# Patient Record
Sex: MALE | Race: BLACK OR AFRICAN AMERICAN | Employment: FULL TIME | URBAN - METROPOLITAN AREA
[De-identification: names, ages, dates, MRNs, and addresses within clinical notes are randomized per-mention and may not be internally consistent; named-entity substitution may affect disease eponyms.]

---

## 2018-02-06 PROBLEM — R59.1 LYMPHADENOPATHY: Status: ACTIVE | Noted: 2017-06-05

## 2018-02-06 PROBLEM — Z92.89 HISTORY OF BLOOD PRODUCT TRANSFUSION: Status: ACTIVE | Noted: 2017-06-05

## 2018-02-06 PROBLEM — Z98.890 HISTORY OF HEMORRHOIDECTOMY: Status: ACTIVE | Noted: 2017-06-05

## 2018-02-06 PROBLEM — Z82.49 FAMILY HISTORY OF MI (MYOCARDIAL INFARCTION): Status: ACTIVE | Noted: 2017-06-05

## 2018-02-06 PROBLEM — F41.9 ANXIETY DISORDER: Status: ACTIVE | Noted: 2018-02-06

## 2018-02-06 PROBLEM — Q28.2 AVM (ARTERIOVENOUS MALFORMATION) BRAIN: Status: ACTIVE | Noted: 2018-02-06

## 2018-02-06 PROBLEM — G47.33 OSA (OBSTRUCTIVE SLEEP APNEA): Status: ACTIVE | Noted: 2018-02-06

## 2018-07-12 ENCOUNTER — HOSPITAL ENCOUNTER (EMERGENCY)
Age: 43
Discharge: LEFT BEFORE DISCHARGE | End: 2018-07-12
Attending: EMERGENCY MEDICINE
Payer: COMMERCIAL

## 2018-07-12 VITALS
OXYGEN SATURATION: 100 % | HEIGHT: 68 IN | BODY MASS INDEX: 23.95 KG/M2 | DIASTOLIC BLOOD PRESSURE: 80 MMHG | HEART RATE: 81 BPM | WEIGHT: 158 LBS | RESPIRATION RATE: 18 BRPM | SYSTOLIC BLOOD PRESSURE: 136 MMHG | TEMPERATURE: 97.4 F

## 2018-07-12 DIAGNOSIS — Z53.21 PATIENT LEFT WITHOUT BEING SEEN: Primary | ICD-10-CM

## 2018-07-12 PROCEDURE — 75810000275 HC EMERGENCY DEPT VISIT NO LEVEL OF CARE

## 2018-07-12 RX ORDER — MECLIZINE HYDROCHLORIDE 25 MG/1
25 TABLET ORAL
COMMUNITY

## 2018-07-12 NOTE — ED NOTES
Pt ambulatory to ER with c/o neck pain and HA after fall out of bed this morning. Pt reports being asleep and dreaming \"and I remember sitting up in my dream and the next thing I know I was on the floor\". Pt reports that he didn't hit anything other than the floor. Pt c/o right sided neck pain and right sided HA. Pt denies any bony tenderness. Pt denies numbness or tingling. Pt denies N/V. Pt denies dizziness or blurry vision. Pt alert and oriented.  RR even and unlabored

## 2018-07-12 NOTE — ED TRIAGE NOTES
Pt arrives ambulatory to ED with c\o neck and back pain after falling out of bed, pt is able to make needs known speaking in complete sentences, pt in nad at this time

## 2019-03-29 PROBLEM — R39.9 LOWER URINARY TRACT SYMPTOMS (LUTS): Status: ACTIVE | Noted: 2019-03-29

## 2019-03-29 PROBLEM — N52.9 ED (ERECTILE DYSFUNCTION) OF ORGANIC ORIGIN: Status: ACTIVE | Noted: 2019-03-29

## 2019-03-29 PROBLEM — N43.3 RIGHT HYDROCELE: Status: ACTIVE | Noted: 2019-03-29

## 2019-03-29 PROBLEM — N50.811 RIGHT TESTICULAR PAIN: Status: ACTIVE | Noted: 2019-03-29

## 2019-09-16 ENCOUNTER — APPOINTMENT (OUTPATIENT)
Dept: GENERAL RADIOLOGY | Age: 44
End: 2019-09-16
Attending: EMERGENCY MEDICINE
Payer: COMMERCIAL

## 2019-09-16 ENCOUNTER — HOSPITAL ENCOUNTER (EMERGENCY)
Age: 44
Discharge: HOME | End: 2019-09-16
Attending: EMERGENCY MEDICINE
Payer: COMMERCIAL

## 2019-09-16 VITALS
HEART RATE: 90 BPM | HEIGHT: 68 IN | RESPIRATION RATE: 18 BRPM | WEIGHT: 175 LBS | TEMPERATURE: 98.5 F | SYSTOLIC BLOOD PRESSURE: 138 MMHG | OXYGEN SATURATION: 100 % | DIASTOLIC BLOOD PRESSURE: 80 MMHG | BODY MASS INDEX: 26.52 KG/M2

## 2019-09-16 DIAGNOSIS — J20.9 ACUTE BRONCHITIS, UNSPECIFIED ORGANISM: Primary | ICD-10-CM

## 2019-09-16 DIAGNOSIS — Z21 HIV POSITIVE (HCC): ICD-10-CM

## 2019-09-16 PROCEDURE — 71046 X-RAY EXAM CHEST 2 VIEWS: CPT

## 2019-09-16 PROCEDURE — 93005 ELECTROCARDIOGRAM TRACING: CPT

## 2019-09-16 PROCEDURE — 99284 EMERGENCY DEPT VISIT MOD MDM: CPT

## 2019-09-16 PROCEDURE — 74011250637 HC RX REV CODE- 250/637: Performed by: EMERGENCY MEDICINE

## 2019-09-16 RX ORDER — DOXYCYCLINE 100 MG/1
100 CAPSULE ORAL
Status: COMPLETED | OUTPATIENT
Start: 2019-09-16 | End: 2019-09-16

## 2019-09-16 RX ORDER — DOXYCYCLINE 100 MG/1
100 CAPSULE ORAL 2 TIMES DAILY
Qty: 14 CAP | Refills: 0 | Status: SHIPPED | OUTPATIENT
Start: 2019-09-16 | End: 2019-09-23

## 2019-09-16 RX ORDER — BENZONATATE 100 MG/1
100 CAPSULE ORAL
Qty: 30 CAP | Refills: 0 | Status: SHIPPED | OUTPATIENT
Start: 2019-09-16 | End: 2019-09-23

## 2019-09-16 RX ADMIN — DOXYCYCLINE 100 MG: 100 CAPSULE ORAL at 23:43

## 2019-09-17 LAB
ATRIAL RATE: 77 BPM
CALCULATED P AXIS, ECG09: 44 DEGREES
CALCULATED R AXIS, ECG10: -22 DEGREES
CALCULATED T AXIS, ECG11: 26 DEGREES
DIAGNOSIS, 93000: NORMAL
P-R INTERVAL, ECG05: 172 MS
Q-T INTERVAL, ECG07: 320 MS
QRS DURATION, ECG06: 86 MS
QTC CALCULATION (BEZET), ECG08: 362 MS
VENTRICULAR RATE, ECG03: 77 BPM

## 2019-09-17 NOTE — ED PROVIDER NOTES
EMERGENCY DEPARTMENT HISTORY AND PHYSICAL EXAM    Date: 9/16/2019  Patient Name: Rondel Brittle. History of Presenting Illness     Chief Complaint   Patient presents with    Cough    Chest Pain         History Provided By: Patient    History:   9:50 PM  Rondel Brittle. is a 38 y.o. male with PMHx of HIV, hepatitis B, and obstructive sleep apnea on CPAP who presents to the emergency department with complaint of a constant nonproductive cough, onset 2 months ago. Associated sxs include chest pain for 2 weeks, chest congestion, postnasal drip. Patient reports sick contact with coworker who also has a cough. Patient takes Clarinex and Flonase for allergies. Patient has a history of heart catheterization in 2013 with cardiac stent placement. Last CD4 count was in the 600's; his viral load is undetected. Has FHx of MI and CHF in father. Patient denies nausea, vomiting, diarrhea, or any other sxs or complaints. Patient does not endorse tobacco, EtOH, or illicit drug use    PCP: Sandee Swenson MD   Infectious Disease Specialist: Honorio King MD    Current Facility-Administered Medications   Medication Dose Route Frequency Provider Last Rate Last Dose    doxycycline (VIBRAMYCIN) capsule 100 mg  100 mg Oral NOW Roseanne Olivas MD         Current Outpatient Medications   Medication Sig Dispense Refill    doxycycline (MONODOX) 100 mg capsule Take 1 Cap by mouth two (2) times a day for 7 days. 14 Cap 0    benzonatate (TESSALON PERLES) 100 mg capsule Take 1 Cap by mouth three (3) times daily as needed for Cough for up to 7 days. 30 Cap 0    pseudoephedrine-codeine-gg (CHERATUSSIN DAC) 30- mg/5 mL solution Take 10 mL by mouth four (4) times daily as needed for Cough for up to 7 days. Max Daily Amount: 40 mL. 90 mL 0    ALPRAZolam (XANAX) 1 mg tablet Take 1 mg by mouth.  vxufahpjt-wdeicrpz-gcnqnyw ala (BIKTARVY) tab tablet Take 1 Tab by mouth.       meclizine (ANTIVERT) 25 mg tablet Take 25 mg by mouth three (3) times daily as needed.  valACYclovir (VALTREX) 1 gram tablet Take  by mouth.  ketoconazole (NIZORAL) 2 % shampoo Apply  to affected area daily as needed for Itching.  clindamycin (CLINDAGEL) 1 % topical gel Apply  to affected area two (2) times a day. use thin film on affected area      atorvastatin (LIPITOR) 10 mg tablet Take 10 mg by mouth daily.  desloratadine (CLARINEX) 5 mg tablet Take 5 mg by mouth daily.  gabapentin (NEURONTIN) 300 mg capsule Take 300 mg by mouth as needed.  methocarbamol (ROBAXIN) 500 mg tablet Take 2 Tabs by mouth three (3) times daily. 24 Tab 0    SERTRALINE HCL (SERTRALINE PO) Take 30 mg by mouth daily.  Aspirin, Buffered 81 mg tab Take 81 mg by mouth daily.  metoprolol succinate (TOPROL-XL) 25 mg XL tablet Take 25 mg by mouth daily. Pt instructed to take with a small bit of water on DOS      nitroglycerin (NITROSTAT) 0.4 mg SL tablet 0.4 mg by SubLINGual route every five (5) minutes as needed for Chest Pain.          Past History     Past Medical History:  Past Medical History:   Diagnosis Date    Acute hepatitis B     Acute MI ([facility] [address] 11/29/2013    s/p stenting, EF normal    Anemia     Chest pain     Chronic headache     Cough     Drowsiness     Dyslipidemia 11/13/2014    Generalized anxiety disorder with panic attacks     on Zoloft adn Xanax prn follwoed by Dr Marisol Lindo    GERD (gastroesophageal reflux disease)     Herpes simplex     HIV (human immunodeficiency virus infection) ([facility] [address]     Hypertension     H/O in the past    IBS (irritable bowel syndrome)     Liver disease     Hepatitis B, currently testing for Hep C    Nocturia     Occipital neuralgia     SHIRA (obstructive sleep apnea)     mild RDI 5 ovidio SpO2 86%  min on 11/7/2017 on CPAP 7cwp    Snoring     Tired     Transfusion history        Past Surgical History:  Past Surgical History:   Procedure Laterality Date    HX APPENDECTOMY      HX CHOLECYSTECTOMY      HX COLONOSCOPY  2006    RECORD Dr. Tony Painting  2013    stents x 2    HX HEMORRHOIDECTOMY  2015    Post-hemorrhoidectomy hemorrhage 2015 hospitalized at [facility]. Pt's h/h went as low . At discharge h/h was  at that time adn required transfuion 4 units of blood       Family History:  Family History   Problem Relation Age of Onset    Heart Failure Father 58    Cancer Father     Diabetes Father     Heart Attack Father     Hypertension Father    Erle Butch Arthritis-rheumatoid Mother     Asthma Mother     Diabetes Mother     Hypertension Mother        Social History:  Social History     Tobacco Use    Smoking status: Former Smoker     Types: Cigars     Last attempt to quit: 6/10/2014     Years since quittin.2    Smokeless tobacco: Never Used   Substance Use Topics    Alcohol use: No    Drug use: No       Allergies: Allergies   Allergen Reactions    Other Food Other (comments)     Shiitake mushrooms- lots of itching and redness     Pollen Extracts Other (comments)         Review of Systems   Review of Systems   Constitutional: Negative for chills, diaphoresis, fever and unexpected weight change. HENT: Negative for congestion, drooling, ear pain, rhinorrhea, sore throat, tinnitus and trouble swallowing. Eyes: Negative for photophobia, pain, redness and visual disturbance. Respiratory: Positive for cough. Negative for choking, chest tightness, shortness of breath, wheezing and stridor. Cardiovascular: Positive for chest pain. Negative for palpitations and leg swelling. Gastrointestinal: Negative for abdominal distention, abdominal pain, anal bleeding, blood in stool, constipation, diarrhea, nausea and vomiting. Genitourinary: Negative for difficulty urinating, dysuria, flank pain, frequency, hematuria and urgency. Musculoskeletal: Negative for arthralgias, back pain and neck pain.    Skin: Negative for color change, rash and wound. Allergic/Immunologic: Positive for environmental allergies and immunocompromised state. Neurological: Negative for dizziness, seizures, syncope, speech difficulty, light-headedness and headaches. Hematological: Does not bruise/bleed easily. Psychiatric/Behavioral: Negative for agitation, behavioral problems, hallucinations, self-injury and suicidal ideas. The patient is not hyperactive. Physical Exam     Vitals:    09/16/19 2056   BP: (!) 144/93   Pulse: 91   Resp: 17   Temp: 98.8 °F (37.1 °C)   SpO2: 100%   Weight: 79.4 kg (175 lb)   Height: 5' 8\" (1.727 m)     Physical Exam   Constitutional: He is oriented to person, place, and time. He appears well-developed and well-nourished. No distress. Pleasant gentleman, well appearing, non-toxic   HENT:   Head: Normocephalic and atraumatic. Right Ear: External ear normal.   Left Ear: External ear normal.   Mouth/Throat: No oropharyngeal exudate. Erythema and hypertrophy to the sinuses. Throat is red. Eyes: Pupils are equal, round, and reactive to light. Conjunctivae and EOM are normal. No scleral icterus. No pallor   Neck: Normal range of motion. Neck supple. No JVD present. No tracheal deviation present. No thyromegaly present. Cardiovascular: Normal rate, regular rhythm and normal heart sounds. Pulmonary/Chest: Effort normal and breath sounds normal. No stridor. No respiratory distress. Abdominal: Soft. Bowel sounds are normal. He exhibits no distension. There is no tenderness. There is no rebound and no guarding. Musculoskeletal: Normal range of motion. He exhibits no edema or tenderness. No soft tissue injuries   Lymphadenopathy:     He has cervical adenopathy. No lymph nodes in posterior triangle. Few scattered anterior cervical adenopathy. Neurological: He is alert and oriented to person, place, and time. He has normal reflexes. No cranial nerve deficit. Coordination normal.   Skin: Skin is warm and dry. No rash noted. He is not diaphoretic. No erythema. Psychiatric: He has a normal mood and affect. His behavior is normal. Judgment and thought content normal.   Nursing note and vitals reviewed. Diagnostic Study Results     Labs -     Recent Results (from the past 12 hour(s))   EKG, 12 LEAD, INITIAL    Collection Time: 09/16/19  9:22 PM   Result Value Ref Range    Ventricular Rate 77 BPM    Atrial Rate 77 BPM    P-R Interval 172 ms    QRS Duration 86 ms    Q-T Interval 320 ms    QTC Calculation (Bezet) 362 ms    Calculated P Axis 44 degrees    Calculated R Axis -22 degrees    Calculated T Axis 26 degrees    Diagnosis       Normal sinus rhythm  Minimal voltage criteria for LVH, may be normal variant  Inferior infarct (cited on or before 02-JUL-2015)  Abnormal ECG  When compared with ECG of 02-MAR-2016 18:50,  No significant change was found         Radiologic Studies -    XR CHEST PA LAT    (Results Pending)     10:01 PM  RADIOLOGY FINDINGS  Chest X-ray shows NAP  Pending review by Radiologist  Recorded by Gia Rogers ED Scribe, as dictated by Mauri. Iqra Grant MD    Medical Decision Making   I am the first provider for this patient. I reviewed the vital signs, available nursing notes, past medical history, past surgical history, family history and social history. Vital Signs-Reviewed the patient's vital signs. Pulse Oximetry Analysis - 100% on room air     Cardiac Monitor:  Rate: 91 bpm  Rhythm: bpm    EKG interpretation: (Preliminary)  9:22 PM  NSR at 77 bpm. Borderline LVH. Q waves in all inferior leads. EKG read by Mauri. Iqra Grant MD     Records Reviewed: Nursing Notes and Old Medical Records    Provider Notes (Medical Decision Making):   Ddx: While he may have hep A and hep B, he has had normal CD4 count and his viral load is undetected. Patient has a normal WBC, 5.2. He has no left shift. General chemistry was normal except blood sugar of 108. He has symptoms of bronchitis.  His x-ray was normal. Will cover with antibiotics. Treat symptoms and discharge. Procedures:  Procedures    MEDICATIONS GIVEN:  Medications   doxycycline (VIBRAMYCIN) capsule 100 mg (has no administration in time range)       ED Course:   9:50 PM   Initial assessment performed. The patients presenting problems have been discussed, and they are in agreement with the care plan formulated and outlined with them. I have encouraged them to ask questions as they arise throughout their visit. Diagnosis and Disposition     DISCHARGE NOTE:  11:28 PM  Seb Curry Jr.'s  results have been reviewed with him. He has been counseled regarding his diagnosis, treatment, and plan. He verbally conveys understanding and agreement of the signs, symptoms, diagnosis, treatment and prognosis and additionally agrees to follow up as discussed. He also agrees with the care-plan and conveys that all of his questions have been answered. I have also provided discharge instructions for him that include: educational information regarding their diagnosis and treatment, and list of reasons why they would want to return to the ED prior to their follow-up appointment, should his condition change. He has been provided with education for proper emergency department utilization. CLINICAL IMPRESSION:    1. Acute bronchitis, unspecified organism    2. [facility]        PLAN:  1. D/C Home  2. Current Discharge Medication List      START taking these medications    Details   doxycycline (MONODOX) 100 mg capsule Take 1 Cap by mouth two (2) times a day for 7 days. Qty: 14 Cap, Refills: 0      benzonatate (TESSALON PERLES) 100 mg capsule Take 1 Cap by mouth three (3) times daily as needed for Cough for up to 7 days. Qty: 30 Cap, Refills: 0      pseudoephedrine-codeine-gg (CHERATUSSIN DAC) 30- mg/5 mL solution Take 10 mL by mouth four (4) times daily as needed for Cough for up to 7 days. Max Daily Amount: 40 mL.   Qty: 90 mL, Refills: 0 Associated Diagnoses: Acute bronchitis, unspecified organism         CONTINUE these medications which have NOT CHANGED    Details   ALPRAZolam (XANAX) 1 mg tablet Take 1 mg by mouth. qwbteokbk-urolwgpi-iduhmlt ala (BIKTARVY) tab tablet Take 1 Tab by mouth.      meclizine (ANTIVERT) 25 mg tablet Take 25 mg by mouth three (3) times daily as needed. valACYclovir (VALTREX) 1 gram tablet Take  by mouth.      ketoconazole (NIZORAL) 2 % shampoo Apply  to affected area daily as needed for Itching. clindamycin (CLINDAGEL) 1 % topical gel Apply  to affected area two (2) times a day. use thin film on affected area      atorvastatin (LIPITOR) 10 mg tablet Take 10 mg by mouth daily. desloratadine (CLARINEX) 5 mg tablet Take 5 mg by mouth daily. gabapentin (NEURONTIN) 300 mg capsule Take 300 mg by mouth as needed. methocarbamol (ROBAXIN) 500 mg tablet Take 2 Tabs by mouth three (3) times daily. Qty: 24 Tab, Refills: 0      SERTRALINE HCL (SERTRALINE PO) Take 30 mg by mouth daily. Aspirin, Buffered 81 mg tab Take 81 mg by mouth daily. metoprolol succinate (TOPROL-XL) 25 mg XL tablet Take 25 mg by mouth daily. Pt instructed to take with a small bit of water on DOS      nitroglycerin (NITROSTAT) 0.4 mg SL tablet 0.4 mg by SubLINGual route every five (5) minutes as needed for Chest Pain. 3.   Follow-up Information     Follow up With Specialties Details Why Contact Info    Lauren Chanel MD Internal Medicine Schedule an appointment as soon as possible for a visit in 2 days For primary care follow up [phone]  [phone]      [facility] EMERGENCY DEPT Emergency Medicine  As needed, If symptoms worsen [address] [phone]  [phone]          _______________________________    This note was prepared by Fidel Espinosa, acting as Scribe for Lucie Crews. Phoenix Logan MD.    Lucie Crews.  Phoenix Logan MD:  The scribe's documentation has been prepared under my direction and personally reviewed by me in its entirety. I confirm that the note above accurately reflects all work, treatment, procedures, and medical decision making performed by me.

## 2019-09-17 NOTE — DISCHARGE INSTRUCTIONS
Bronchitis: Care Instructions  Your Care Instructions    Bronchitis is inflammation of the bronchial tubes, which carry air to the lungs. The tubes swell and produce mucus, or phlegm. The mucus and inflamed bronchial tubes make you cough. You may have trouble breathing. Most cases of bronchitis are caused by viruses like those that cause colds. Antibiotics usually do not help and they may be harmful. Bronchitis usually develops rapidly and lasts about 2 to 3 weeks in otherwise healthy people. Follow-up care is a key part of your treatment and safety. Be sure to make and go to all appointments, and call your doctor if you are having problems. It's also a good idea to know your test results and keep a list of the medicines you take. How can you care for yourself at home? · Take all medicines exactly as prescribed. Call your doctor if you think you are having a problem with your medicine. · Get some extra rest.  · Take an over-the-counter pain medicine, such as acetaminophen (Tylenol), ibuprofen (Advil, Motrin), or naproxen (Aleve) to reduce fever and relieve body aches. Read and follow all instructions on the label. · Do not take two or more pain medicines at the same time unless the doctor told you to. Many pain medicines have acetaminophen, which is Tylenol. Too much acetaminophen (Tylenol) can be harmful. · Take an over-the-counter cough medicine that contains dextromethorphan to help quiet a dry, hacking cough so that you can sleep. Avoid cough medicines that have more than one active ingredient. Read and follow all instructions on the label. · Breathe moist air from a humidifier, hot shower, or sink filled with hot water. The heat and moisture will thin mucus so you can cough it out. · Do not smoke. Smoking can make bronchitis worse. If you need help quitting, talk to your doctor about stop-smoking programs and medicines. These can increase your chances of quitting for good.   When should you call for help? Call 911 anytime you think you may need emergency care. For example, call if:    · You have severe trouble breathing.    Call your doctor now or seek immediate medical care if:    · You have new or worse trouble breathing.     · You cough up dark brown or bloody mucus (sputum).     · You have a new or higher fever.     · You have a new rash.    Watch closely for changes in your health, and be sure to contact your doctor if:    · You cough more deeply or more often, especially if you notice more mucus or a change in the color of your mucus.     · You are not getting better as expected. Where can you learn more? Go to http://mariaa-chioma.info/. Enter H333 in the search box to learn more about \"Bronchitis: Care Instructions. \"  Current as of: September 5, 2018  Content Version: 12.1  © 8073-8464 [employer]. Care instructions adapted under license by [employer] (which disclaims liability or warranty for this information). If you have questions about a medical condition or this instruction, always ask your healthcare professional. [address] any warranty or liability for your use of this information. HIV: Care Instructions  Your Care Instructions    Human immunodeficiency virus, or HIV, is a virus that attacks your immune system. This makes it hard for your body to fight infection and disease. HIV is the virus that causes AIDS (acquired immunodeficiency syndrome). But having HIV does not mean that you have AIDS. Treatment of HIV may prevent or delay HIV from developing into AIDS. HIV often causes flu-like symptoms soon after a person gets infected. These early symptoms go away in a few weeks. After that, you may not have signs of illness for many years. But as the virus multiplies in your body, symptoms reappear and then remain. Fatigue, weight loss, fever, night sweats, diarrhea, and other symptoms are common.  If HIV is not treated and progresses to AIDS, your symptoms get worse and your body is less and less able to fight infections like pneumonia and tuberculosis. Medicines are the main treatment for HIV. You will likely have to take several medicines, sometimes called an anti-HIV \"cocktail. \" By fighting the virus, these medicines can help your immune system stay healthy and delay or prevent AIDS, and may help you live longer. Medicines for HIV are called antiretrovirals. Follow-up care is a key part of your treatment and safety. Be sure to make and go to all appointments, and call your doctor if you are having problems. It's also a good idea to know your test results and keep a list of the medicines you take. How can you care for yourself at home? · If you are taking medicines for HIV, take them exactly as directed, in the right dose and at the right time. Call your doctor if you think you are having a problem with your medicine. · Learn how to shop for, prepare, and store food safely to reduce your risk of food poisoning. People with HIV are more likely to get food-borne diseases. · Stop smoking. People with HIV have an increased risk of heart attacks and lung cancer. Smoking increases these risks even more. If you need help quitting, talk to your doctor about stop-smoking programs and medicines. These can increase your chances of quitting for good. · Do not use illegal drugs, and limit your use of alcohol. Using intravenous (IV) illegal drugs increases the risk that your HIV will get worse and makes it harder to follow your treatment plan. Abuse of alcohol, marijuana, cocaine, or other illegal drugs also makes HIV get worse faster. · Eat a healthy diet. Good nutrition can help your immune system and improve your overall health. Staying at a healthy weight can be hard, since weight loss and digestion problems are common with HIV and are side effects of some HIV medicines. Sometimes you just won't feel like eating.  Talk to your doctor or see a dietitian if you need help. · Get regular exercise. It relieves stress. It also keeps your heart, lungs, and muscles strong and helps you feel less tired. It may also help your immune system work better. · Learn more about HIV. This will let you take a more active role in your care. It may help you feel more in control of your life. · Join a support group. Support groups can be a good place to share information, problem-solving tips, and emotions. To avoid spreading HIV to others  · Take antiretroviral medicines. Getting treated for HIV can help prevent the spread of HIV to people who are not infected. · Tell your sex partner or partners that you have HIV. Do not have sex with anyone unless you have told him or her that you have HIV. · If you and your partner choose to have sex, always use a latex condom. · Do not share needles if you use IV drugs. · Do not share toothbrushes, razors, sex toys, or other items that may have blood, semen, or vaginal fluids on them. · Do not donate blood, plasma, semen, body organs, or body tissues. When should you call for help? Call 911 anytime you think you may need emergency care.  For example, call if:    · You have seizures.     · You passed out (lost consciousness).     · You have new weakness in an arm, a leg, or on one side of your body.     · You have new changes in balance or sensation (numbness, tingling, or pain).     · You are suddenly not able to stand or walk.    Call your doctor now or seek immediate medical care if:    · You have a fever that ever reaches 103°F or higher.     · You have a fever of 101°F or higher for 24 hours.     · You have shortness of breath.     · You have unusual bleeding, such as from your nose or gums, blood in your urine or stool, or easy bruising.     · You have changes in vision.    Watch closely for changes in your health, and be sure to contact your doctor if:    · You have a cough that does not go away, especially if you also have a fever.     · You have rapid, unexplained weight loss.     · You have night sweats.     · You have swollen lymph nodes in your neck, armpits, or groin.     · You feel very tired. Where can you learn more? Go to http://mariaa-chioma.info/. Enter J259 in the search box to learn more about \"HIV: Care Instructions. \"  Current as of: July 30, 2018  Content Version: 12.1  © 1604-4864 [employer], [employer]. Care instructions adapted under license by [employer] (which disclaims liability or warranty for this information). If you have questions about a medical condition or this instruction, always ask your healthcare professional. [address] any warranty or liability for your use of this information.

## 2019-09-17 NOTE — ED NOTES
I have reviewed discharge instructions with the patient. The patient verbalized understanding. Discharge medications reviewed with patient and appropriate educational materials and side effects teaching were provided. I have reviewed the provider's instructions with the patient, answering all questions to his satisfaction.

## 2019-10-13 ENCOUNTER — HOSPITAL ENCOUNTER (EMERGENCY)
Age: 44
Discharge: HOME | End: 2019-10-13
Attending: EMERGENCY MEDICINE
Payer: COMMERCIAL

## 2019-10-13 VITALS
HEART RATE: 93 BPM | WEIGHT: 173 LBS | SYSTOLIC BLOOD PRESSURE: 133 MMHG | DIASTOLIC BLOOD PRESSURE: 95 MMHG | BODY MASS INDEX: 26.22 KG/M2 | RESPIRATION RATE: 16 BRPM | HEIGHT: 68 IN | TEMPERATURE: 97.9 F

## 2019-10-13 DIAGNOSIS — Z86.14 HISTORY OF MRSA INFECTION: Primary | ICD-10-CM

## 2019-10-13 DIAGNOSIS — R22.0 SWELLING OF NOSE: ICD-10-CM

## 2019-10-13 PROCEDURE — 99281 EMR DPT VST MAYX REQ PHY/QHP: CPT

## 2019-10-13 RX ORDER — MUPIROCIN 20 MG/G
OINTMENT TOPICAL DAILY
Qty: 22 G | Refills: 0 | Status: SHIPPED | OUTPATIENT
Start: 2019-10-13

## 2019-10-13 RX ORDER — SULFAMETHOXAZOLE AND TRIMETHOPRIM 800; 160 MG/1; MG/1
1 TABLET ORAL 2 TIMES DAILY
Qty: 20 TAB | Refills: 0 | Status: SHIPPED | OUTPATIENT
Start: 2019-10-13 | End: 2019-10-23

## 2019-10-13 NOTE — ED PROVIDER NOTES
EMERGENCY DEPARTMENT HISTORY AND PHYSICAL EXAM    Date: 10/13/2019  Patient Name: Emma Talavera History of Presenting Illness     Chief Complaint   Patient presents with    Skin Problem         History Provided By: Patient    Emma Talavera is a 38 y.o. male with PMHX of HIV, MRSA who presents to the emergency department C/O nasal swelling. Associated sxs include nasal pain, bump on the nose. Patient reports a history of MRSA that abscess to the nose approximately 1 year ago for which she saw dermatology was prescribed Bactroban ointment and Bactrim p.o. with complete resolution of symptoms. Patient states approximately 3 days ago started to notice some pain and swelling to the end of his nose just and anterior to the right nare consistent with his past abscess. Patient here asking for refills of the Bactroban ointment and Bactrim until he can see his dermatologist next week. Patient states being seen at this facility just a couple of weeks ago for some URI symptoms diagnosed with bronchitis patient reports feeling better much improvement of his symptoms. Pt denies fevers, drainage from nose, eye pain, lesions or wounds to the remainder of face, and any other sxs or complaints. PCP: Ac Ferrer MD    Current Outpatient Medications   Medication Sig Dispense Refill    mupirocin (BACTROBAN) 2 % ointment Apply  to affected area daily. 22 g 0    trimethoprim-sulfamethoxazole (BACTRIM DS) 160-800 mg per tablet Take 1 Tab by mouth two (2) times a day for 10 days. 20 Tab 0    ALPRAZolam (XANAX) 1 mg tablet Take 1 mg by mouth.  pyzjtzyvb-kkxzklmz-cottqus ala (BIKTARVY) tab tablet Take 1 Tab by mouth.  meclizine (ANTIVERT) 25 mg tablet Take 25 mg by mouth three (3) times daily as needed.  valACYclovir (VALTREX) 1 gram tablet Take  by mouth.  ketoconazole (NIZORAL) 2 % shampoo Apply  to affected area daily as needed for Itching.       clindamycin (CLINDAGEL) 1 % topical gel Apply to affected area two (2) times a day. use thin film on affected area      atorvastatin (LIPITOR) 10 mg tablet Take 10 mg by mouth daily.  desloratadine (CLARINEX) 5 mg tablet Take 5 mg by mouth daily.  gabapentin (NEURONTIN) 300 mg capsule Take 300 mg by mouth as needed.  methocarbamol (ROBAXIN) 500 mg tablet Take 2 Tabs by mouth three (3) times daily. 24 Tab 0    SERTRALINE HCL (SERTRALINE PO) Take 30 mg by mouth daily.  Aspirin, Buffered 81 mg tab Take 81 mg by mouth daily.  metoprolol succinate (TOPROL-XL) 25 mg XL tablet Take 25 mg by mouth daily. Pt instructed to take with a small bit of water on DOS      nitroglycerin (NITROSTAT) 0.4 mg SL tablet 0.4 mg by SubLINGual route every five (5) minutes as needed for Chest Pain. Past History     Past Medical History:  Past Medical History:   Diagnosis Date    Acute hepatitis B     [facility]2013    s/p stenting, EF normal    Anemia     Chest pain     Chronic headache     Cough     Drowsiness     Dyslipidemia 11/13/2014    Generalized anxiety disorder with panic attacks     on Zoloft adn Xanax prn follwoed by Dr Toi Callahan    GERD (gastroesophageal reflux disease)     Herpes simplex     HIV [facility] immunodeficiency virus [facility]) [facility]     Hypertension     H/O in the past    IBS (irritable bowel syndrome)     Liver disease     Hepatitis B, currently testing for Hep C    Nocturia     Occipital neuralgia     SHIRA (obstructive sleep apnea)     mild RDI 5 ovidio SpO2 86%  min on 11/7/2017 on CPAP 7cwp    Snoring     Tired     Transfusion history        Past Surgical History:  Past Surgical History:   Procedure Laterality Date    HX APPENDECTOMY      HX CHOLECYSTECTOMY      HX COLONOSCOPY  2006    RECORD Dr. Misty Paige  11/29/2013    stents x 2    HX HEMORRHOIDECTOMY  2015    Post-hemorrhoidectomy hemorrhage July 2015 hospitalized at [facility].  Pt's h/h went as low . At discharge h/h was 9/28 at that time adn required transfuion 4 units of blood       Family History:  Family History   Problem Relation Age of Onset    Heart Failure Father 58    Cancer Father     Diabetes Father     Heart Attack Father     Hypertension Father    [facility] Arthritis-rheumatoid Mother     Asthma Mother     Diabetes Mother     Hypertension Mother        Social History:  Social History     Tobacco Use    Smoking status: Former Smoker     Types: Cigars     Last attempt to quit: 6/10/2014     Years since quittin.3    Smokeless tobacco: Never Used   Substance Use Topics    Alcohol use: No    Drug use: No       Allergies: Allergies   Allergen Reactions    Other Food Other (comments)     Shiitake mushrooms- lots of itching and redness     Pollen Extracts Other (comments)         Review of Systems   Review of Systems   Constitutional: Negative for fever. HENT: Positive for facial swelling (Nose). Negative for congestion and nosebleeds. Eyes: Negative for redness. Respiratory: Negative for cough and shortness of breath. Cardiovascular: Negative for chest pain. All other systems reviewed and are negative. Physical Exam     Vitals:    10/13/19 1559   BP: (!) 133/95   Pulse: 93   Resp: 16   Temp: 97.9 °F (36.6 °C)   Weight: 78.5 kg (173 lb)   Height: 5' 8\" (1.727 m)     Physical Exam   Constitutional: He is oriented to person, place, and time. He appears well-developed and well-nourished. No distress. HENT:   Head: Normocephalic and atraumatic. Right Ear: External ear normal.   Left Ear: External ear normal.   Nose: No mucosal edema, nasal deformity or nasal septal hematoma. Mouth/Throat: Oropharynx is clear and moist. No oropharyngeal exudate. 1 very small tender papule noted as shown, no purulence no exudate no clusters of vesicles remainder of nose and face within normal limits   Eyes: Pupils are equal, round, and reactive to light.  Conjunctivae and EOM are normal.   Neck: Normal range of motion. Neck supple. Cardiovascular: Normal rate and regular rhythm. Pulmonary/Chest: Effort normal and breath sounds normal.   Musculoskeletal: Normal range of motion. Neurological: He is alert and oriented to person, place, and time. Skin: Skin is warm and dry. Psychiatric: He has a normal mood and affect. His behavior is normal.   Nursing note and vitals reviewed. Diagnostic Study Results     Labs -   No results found for this or any previous visit (from the past 12 hour(s)). Radiologic Studies -   No orders to display     CT Results  (Last 48 hours)    None        CXR Results  (Last 48 hours)    None          Medications given in the ED-  Medications - No data to display      Medical Decision Making   I am the first provider for this patient. I reviewed the vital signs, available nursing notes, past medical history, past surgical history, family history and social history. Vital Signs-Reviewed the patient's vital signs. Records Reviewed: Nursing Notes    Procedures:  Procedures    ED Course:   4:20 PM   Initial assessment performed. The patients presenting problems have been discussed, and they are in agreement with the care plan formulated and outlined with them. I have encouraged them to ask questions as they arise throughout their visit. Discussion: 38 y.o. male with history of MRSA with abscess to the nose and HIV on antiviral medications and compliant complaining of swelling and pain to his nose consistent with past bouts of abscess from MRSA. He is afebrile with appropriate vital signs small papule noted to nose without vesicle or large fluctuant abscess no require for I&D nasal mucosa is normal.  Plan for Bactroban ointment Bactrim and dermatology follow-up with strict return precautions discussed. Diagnosis and Disposition       DISCHARGE NOTE:  Bailey Evans Jr.'s  results have been reviewed with him.   He has been counseled regarding his diagnosis, treatment, and plan. He verbally conveys understanding and agreement of the signs, symptoms, diagnosis, treatment and prognosis and additionally agrees to follow up as discussed. He also agrees with the care-plan and conveys that all of his questions have been answered. I have also provided discharge instructions for him that include: educational information regarding their diagnosis and treatment, and list of reasons why they would want to return to the ED prior to their follow-up appointment, should his condition change. He has been provided with education for proper emergency department utilization. CLINICAL IMPRESSION:    1. History of MRSA infection    2. Swelling of nose        PLAN:  1. D/C Home  2. Current Discharge Medication List      START taking these medications    Details   mupirocin (BACTROBAN) 2 % ointment Apply  to affected area daily. Qty: 22 g, Refills: 0      trimethoprim-sulfamethoxazole (BACTRIM DS) 160-800 mg per tablet Take 1 Tab by mouth two (2) times a day for 10 days. Qty: 20 Tab, Refills: 0           3. Follow-up Information     Follow up With Specialties Details Why Contact Info    Lawanda Gomez MD Internal Medicine Schedule an appointment as soon as possible for a visit  [address] [phone]      [facility] [place] EMERGENCY DEPT Emergency Medicine  As needed, If symptoms worsen [address] [phone]  [phone]                  Please note that this dictation was completed with [employer], the computer voice recognition software. Quite often unanticipated grammatical, syntax, homophones, and other interpretive errors are inadvertently transcribed by the computer software. Please disregard these errors. Please excuse any errors that have escaped final proofreading.

## 2019-10-13 NOTE — ED TRIAGE NOTES
Pt arrives ambu to ED w/ c/o of redness and swelling to nose. Pt states he has hx of MRSA and have the same exact symptoms as last time. Pt called PCP and was told to come here for further follow up. Pt in NAD, noted swelling to nose and some redness.

## 2019-12-17 ENCOUNTER — HOSPITAL ENCOUNTER (EMERGENCY)
Age: 44
Discharge: HOME | End: 2019-12-18
Attending: EMERGENCY MEDICINE
Payer: COMMERCIAL

## 2019-12-17 ENCOUNTER — APPOINTMENT (OUTPATIENT)
Dept: GENERAL RADIOLOGY | Age: 44
End: 2019-12-17
Attending: EMERGENCY MEDICINE
Payer: COMMERCIAL

## 2019-12-17 DIAGNOSIS — R14.1 GAS PAIN: ICD-10-CM

## 2019-12-17 DIAGNOSIS — R10.9 RIGHT SIDED ABDOMINAL PAIN: Primary | ICD-10-CM

## 2019-12-17 DIAGNOSIS — R06.09 DYSPNEA ON EXERTION: ICD-10-CM

## 2019-12-17 DIAGNOSIS — I25.2 HISTORY OF ACUTE INFERIOR WALL MI: ICD-10-CM

## 2019-12-17 LAB
ALBUMIN SERPL-MCNC: 3.8 G/DL (ref 3.4–5)
ALBUMIN/GLOB SERPL: 1.2 {RATIO} (ref 0.8–1.7)
ALP SERPL-CCNC: 54 U/L (ref 45–117)
ALT SERPL-CCNC: 36 U/L (ref 16–61)
ANION GAP SERPL CALC-SCNC: 7 MMOL/L (ref 3–18)
AST SERPL-CCNC: 18 U/L (ref 10–38)
BASOPHILS # BLD: 0 K/UL (ref 0–0.1)
BASOPHILS NFR BLD: 0 % (ref 0–2)
BILIRUB SERPL-MCNC: 0.7 MG/DL (ref 0.2–1)
BNP SERPL-MCNC: 6 PG/ML (ref 0–450)
BUN SERPL-MCNC: 10 MG/DL (ref 7–18)
BUN/CREAT SERPL: 8 (ref 12–20)
CALCIUM SERPL-MCNC: 8.9 MG/DL (ref 8.5–10.1)
CHLORIDE SERPL-SCNC: 104 MMOL/L (ref 100–111)
CK MB CFR SERPL CALC: NORMAL % (ref 0–4)
CK MB SERPL-MCNC: <1 NG/ML (ref 5–25)
CK SERPL-CCNC: 123 U/L (ref 39–308)
CO2 SERPL-SCNC: 30 MMOL/L (ref 21–32)
CREAT SERPL-MCNC: 1.24 MG/DL (ref 0.6–1.3)
DIFFERENTIAL METHOD BLD: ABNORMAL
EOSINOPHIL # BLD: 0.2 K/UL (ref 0–0.4)
EOSINOPHIL NFR BLD: 4 % (ref 0–5)
ERYTHROCYTE [DISTWIDTH] IN BLOOD BY AUTOMATED COUNT: 14.5 % (ref 11.6–14.5)
GLOBULIN SER CALC-MCNC: 3.2 G/DL (ref 2–4)
GLUCOSE SERPL-MCNC: 101 MG/DL (ref 74–99)
HCT VFR BLD AUTO: 44 % (ref 36–48)
HGB BLD-MCNC: 15.2 G/DL (ref 13–16)
LYMPHOCYTES # BLD: 2.6 K/UL (ref 0.9–3.6)
LYMPHOCYTES NFR BLD: 43 % (ref 21–52)
MCH RBC QN AUTO: 31.1 PG (ref 24–34)
MCHC RBC AUTO-ENTMCNC: 34.5 G/DL (ref 31–37)
MCV RBC AUTO: 90.2 FL (ref 74–97)
MONOCYTES # BLD: 0.6 K/UL (ref 0.05–1.2)
MONOCYTES NFR BLD: 11 % (ref 3–10)
NEUTS SEG # BLD: 2.6 K/UL (ref 1.8–8)
NEUTS SEG NFR BLD: 42 % (ref 40–73)
PLATELET # BLD AUTO: 222 K/UL (ref 135–420)
PMV BLD AUTO: 10.2 FL (ref 9.2–11.8)
POTASSIUM SERPL-SCNC: 3.5 MMOL/L (ref 3.5–5.5)
PROT SERPL-MCNC: 7 G/DL (ref 6.4–8.2)
RBC # BLD AUTO: 4.88 M/UL (ref 4.7–5.5)
SODIUM SERPL-SCNC: 141 MMOL/L (ref 136–145)
TROPONIN I SERPL-MCNC: <0.02 NG/ML (ref 0–0.04)
WBC # BLD AUTO: 6.1 K/UL (ref 4.6–13.2)

## 2019-12-17 PROCEDURE — 99284 EMERGENCY DEPT VISIT MOD MDM: CPT

## 2019-12-17 PROCEDURE — 85025 COMPLETE CBC W/AUTO DIFF WBC: CPT

## 2019-12-17 PROCEDURE — 80053 COMPREHEN METABOLIC PANEL: CPT

## 2019-12-17 PROCEDURE — 83880 ASSAY OF NATRIURETIC PEPTIDE: CPT

## 2019-12-17 PROCEDURE — 82550 ASSAY OF CK (CPK): CPT

## 2019-12-17 PROCEDURE — 83690 ASSAY OF LIPASE: CPT

## 2019-12-17 PROCEDURE — 93005 ELECTROCARDIOGRAM TRACING: CPT

## 2019-12-17 PROCEDURE — 71046 X-RAY EXAM CHEST 2 VIEWS: CPT

## 2019-12-17 PROCEDURE — 74018 RADEX ABDOMEN 1 VIEW: CPT

## 2019-12-18 VITALS
WEIGHT: 170 LBS | BODY MASS INDEX: 25.76 KG/M2 | OXYGEN SATURATION: 98 % | DIASTOLIC BLOOD PRESSURE: 92 MMHG | HEART RATE: 99 BPM | SYSTOLIC BLOOD PRESSURE: 120 MMHG | RESPIRATION RATE: 16 BRPM | HEIGHT: 68 IN | TEMPERATURE: 98.5 F

## 2019-12-18 LAB
ATRIAL RATE: 96 BPM
CALCULATED P AXIS, ECG09: 41 DEGREES
CALCULATED R AXIS, ECG10: -16 DEGREES
CALCULATED T AXIS, ECG11: 35 DEGREES
DIAGNOSIS, 93000: NORMAL
LIPASE SERPL-CCNC: 127 U/L (ref 73–393)
P-R INTERVAL, ECG05: 166 MS
Q-T INTERVAL, ECG07: 310 MS
QRS DURATION, ECG06: 76 MS
QTC CALCULATION (BEZET), ECG08: 391 MS
VENTRICULAR RATE, ECG03: 96 BPM

## 2019-12-18 NOTE — DISCHARGE INSTRUCTIONS
Gas and Bloating: Care Instructions  Your Care Instructions    Gas and bloating can be uncomfortable and embarrassing problems. All people pass gas, but some people produce more gas than others, sometimes enough to cause distress. It is normal to pass gas from 6 to 20 times per day. Excess gas usually is not caused by a serious health problem. Gas and bloating usually are caused by something you eat or drink, including some food supplements and medicines. Gas and bloating are usually harmless and go away without treatment. However, changing your diet can help end the problem. Some over-the-counter medicines can help prevent gas and relieve bloating. Follow-up care is a key part of your treatment and safety. Be sure to make and go to all appointments, and call your doctor if you are having problems. It's also a good idea to know your test results and keep a list of the medicines you take. How can you care for yourself at home? · Keep a food diary if you think a food gives you gas. Write down what you eat or drink. Also record when you get gas. If you notice that a food seems to cause your gas each time, avoid it and see if the gas goes away. Examples of foods that cause gas include:  ? Fried and fatty foods. ? Beans. ? Vegetables such as artichokes, asparagus, broccoli, brussels sprouts, cabbage, cauliflower, cucumbers, green peppers, onions, peas, radishes, and raw potatoes. ? Fruits such as apricots, bananas, melons, peaches, pears, prunes, and raw apples. ? Wheat and wheat bran. · Soak dry beans in water overnight, then dump the water and cook the soaked beans in new water. This can help prevent gas and bloating. · If you have problems with lactose, avoid dairy products such as milk and cheese. · Try not to swallow air. Do not drink through a straw, gulp your food, or chew gum. · Take an over-the-counter medicine. Read and follow all instructions on the label. ?  Food enzymes, such as Beano, can be added to gas-producing foods to prevent gas. ? Antacids, such as Maalox Anti-Gas and Mylanta Gas, can relieve bloating by making you burp. Be careful when you take over-the-counter antacid medicines. Many of these medicines have aspirin in them. Read the label to make sure that you are not taking more than the recommended dose. Too much aspirin can be harmful. ? Activated charcoal tablets, such as CharcoCaps, may decrease odor from gas you pass. ? If you have problems with lactose, you can take medicines such as Dairy Ease and Lactaid with dairy products to prevent gas and bloating. · Get some exercise regularly. When should you call for help? Call 911 anytime you think you may need emergency care. For example, call if:    · You have gas and signs of a heart attack, such as:  ? Chest pain or pressure. ? Sweating. ? Shortness of breath. ? Nausea or vomiting. ? Pain that spreads from the chest to the neck, jaw, or one or both shoulders or arms. ? Dizziness or lightheadedness. ? A fast or uneven pulse. After calling 911, chew 1 adult-strength aspirin. Wait for an ambulance. Do not try to drive yourself.    Call your doctor now or seek immediate medical care if:    · You have severe belly pain.     · You have blood in your stool.    Watch closely for changes in your health, and be sure to contact your doctor if:    · You have blood or pus in your urine.     · Your urine is cloudy or smells bad.     · You are burping and have trouble swallowing.     · You feel bloated and have swelling in your belly.     · You do not get better as expected. Where can you learn more? Go to http://mariaa-chioma.info/. Enter F538 in the search box to learn more about \"Gas and Bloating: Care Instructions. \"  Current as of: June 26, 2019  Content Version: 12.2  © 6052-0549 [email or web address], [employer].  Care instructions adapted under license by [employer] (which disclaims liability or warranty for this information). If you have questions about a medical condition or this instruction, always ask your healthcare professional. [address] any warranty or liability for your use of this information. Abdominal Pain: Care Instructions  Your Care Instructions    Abdominal pain has many possible causes. Some aren't serious and get better on their own in a few days. Others need more testing and treatment. If your pain continues or gets worse, you need to be rechecked and may need more tests to find out what is wrong. You may need surgery to correct the problem. Don't ignore new symptoms, such as fever, nausea and vomiting, urination problems, pain that gets worse, and dizziness. These may be signs of a more serious problem. Your doctor may have recommended a follow-up visit in the next 8 to 12 hours. If you are not getting better, you may need more tests or treatment. The doctor has checked you carefully, but problems can develop later. If you notice any problems or new symptoms, get medical treatment right away. Follow-up care is a key part of your treatment and safety. Be sure to make and go to all appointments, and call your doctor if you are having problems. It's also a good idea to know your test results and keep a list of the medicines you take. How can you care for yourself at home? · Rest until you feel better. · To prevent dehydration, drink plenty of fluids, enough so that your urine is light yellow or clear like water. Choose water and other caffeine-free clear liquids until you feel better. If you have kidney, heart, or liver disease and have to limit fluids, talk with your doctor before you increase the amount of fluids you drink. · If your stomach is upset, eat mild foods, such as rice, dry toast or crackers, bananas, and applesauce. Try eating several small meals instead of two or three large ones.   · Wait until 48 hours after all symptoms have gone away before you have spicy foods, alcohol, and drinks that contain caffeine. · Do not eat foods that are high in fat. · Avoid anti-inflammatory medicines such as aspirin, ibuprofen (Advil, Motrin), and naproxen (Aleve). These can cause stomach upset. Talk to your doctor if you take daily aspirin for another health problem. When should you call for help? Call 911 anytime you think you may need emergency care. For example, call if:    · You passed out (lost consciousness).     · You pass maroon or very bloody stools.     · You vomit blood or what looks like coffee grounds.     · You have new, severe belly pain.    Call your doctor now or seek immediate medical care if:    · Your pain gets worse, especially if it becomes focused in one area of your belly.     · You have a new or higher fever.     · Your stools are black and look like tar, or they have streaks of blood.     · You have unexpected vaginal bleeding.     · You have symptoms of a urinary tract infection. These may include:  ? Pain when you urinate. ? Urinating more often than usual.  ? Blood in your urine.     · You are dizzy or lightheaded, or you feel like you may faint.    Watch closely for changes in your health, and be sure to contact your doctor if:    · You are not getting better after 1 day (24 hours). Where can you learn more? Go to http://mariaa-chioma.info/. Enter B714 in the search box to learn more about \"Abdominal Pain: Care Instructions. \"  Current as of: June 26, 2019  Content Version: 12.2  © 3214-8658 [employer]. Care instructions adapted under license by [employer] (which disclaims liability or warranty for this information). If you have questions about a medical condition or this instruction, always ask your healthcare professional. [address] any warranty or liability for your use of this information.

## 2019-12-18 NOTE — ED TRIAGE NOTES
Patient reports to ED with c/c of not feeling right for the past few days. Patient is requesting an EKG due to having a heart attack in 2013, patient is nervous something is not right. He complains of sob with exertion and bloating. He reports he just took a Xanax pta and it seems to be helping per patient. Patient reports his watch showed an inconclusive with the EKG and he felt nervous and wanted to be seen.

## 2019-12-18 NOTE — ED PROVIDER NOTES
EMERGENCY DEPARTMENT HISTORY AND PHYSICAL EXAM    Date: 12/17/2019  Patient Name: Gio Leon. History of Presenting Illness     Chief Complaint   Patient presents with    Anxiety         History Provided By: Patient    Additional History (Context):     11:01 PM    Gio Lee is a 38 y.o. male with pertinent PMHx of inferior MI, HTN, liver disease, anemia, HIV, and SHIRA presenting ambulatory to the ED c/o SOB over the last few days, especially with exertion. Pt states that sleeps with a sleep apnea machine. Pt notes associated symptoms of anxiety and palpitations tonight; as he was walking up the stairs. He notes also some recently abdominal bloating. He does have HAs chronically, which he associated with stress at work. Pt follows up with [facility] cardiology and has yearly appointments with stress tests. Per chart review, last Echo showed nml EF with normal inferior wall changes c/w prior MI. Pt denies any h/o CHF. Pt denies any leg swelling, cough, or fever/chills. Pt notes a FHx of premature cardiac disease. He denies any smoking, drinking EtOH, or illicit drug use. PCP: Keyana Lau MD     There are no other complaints, changes, or physical findings at this time. Current Outpatient Medications   Medication Sig Dispense Refill    ALPRAZolam (XANAX) 1 mg tablet Take 1 mg by mouth.  pyjurfyqv-btdvjaoc-mcdbszo ala (BIKTARVY) tab tablet Take 1 Tab by mouth.  meclizine (ANTIVERT) 25 mg tablet Take 25 mg by mouth three (3) times daily as needed.  valACYclovir (VALTREX) 1 gram tablet Take  by mouth.  ketoconazole (NIZORAL) 2 % shampoo Apply  to affected area daily as needed for Itching.  clindamycin (CLINDAGEL) 1 % topical gel Apply  to affected area two (2) times a day. use thin film on affected area      atorvastatin (LIPITOR) 10 mg tablet Take 10 mg by mouth daily.  desloratadine (CLARINEX) 5 mg tablet Take 5 mg by mouth daily.       gabapentin (NEURONTIN) 300 mg capsule Take 300 mg by mouth as needed.  Aspirin, Buffered 81 mg tab Take 81 mg by mouth daily.  metoprolol succinate (TOPROL-XL) 25 mg XL tablet Take 25 mg by mouth daily. Pt instructed to take with a small bit of water on DOS      nitroglycerin (NITROSTAT) 0.4 mg SL tablet 0.4 mg by SubLINGual route every five (5) minutes as needed for Chest Pain.  mupirocin (BACTROBAN) 2 % ointment Apply  to affected area daily. 22 g 0    methocarbamol (ROBAXIN) 500 mg tablet Take 2 Tabs by mouth three (3) times daily. 24 Tab 0       Past History     Past Medical History:  Past Medical History:   Diagnosis Date    Acute hepatitis B     Acute MI ([facility] [address] 11/29/2013    s/p stenting, EF normal    Anemia     Chest pain     Chronic headache     Cough     Drowsiness     Dyslipidemia 11/13/2014    Generalized anxiety disorder with panic attacks     on Zoloft adn Xanax prn follwoed by Dr Levi Pascual    GERD (gastroesophageal reflux disease)     Herpes simplex     HIV (human immunodeficiency virus infection) ([facility] [address]     Hypertension     H/O in the past    IBS (irritable bowel syndrome)     Liver disease     Hepatitis B, currently testing for Hep C    Nocturia     Occipital neuralgia     SHIRA (obstructive sleep apnea)     mild RDI 5 ovidio SpO2 86%  min on 11/7/2017 on CPAP 7cwp    Snoring     Tired     Transfusion history        Past Surgical History:  Past Surgical History:   Procedure Laterality Date    HX APPENDECTOMY      HX CHOLECYSTECTOMY      HX COLONOSCOPY  2006    RECORD Dr. Gregory Arias  11/29/2013    stents x 2    HX HEMORRHOIDECTOMY  2015    Post-hemorrhoidectomy hemorrhage July 2015 hospitalized at [facility]. Pt's h/h went as low 6/19.  At discharge h/h was 9/28 at that time adn required transfuion 4 units of blood       Family History:  Family History   Problem Relation Age of Onset    Heart Failure Father 58    Cancer Father     Diabetes Father     Heart Attack Father     Hypertension Father    [facility] Arthritis-rheumatoid Mother     Asthma Mother     Diabetes Mother     Hypertension Mother        Social History:  Social History     Tobacco Use    Smoking status: Former Smoker     Types: Cigars     Last attempt to quit: 6/10/2014     Years since quittin.5    Smokeless tobacco: Never Used   Substance Use Topics    Alcohol use: No    Drug use: No       Allergies: Allergies   Allergen Reactions    Other Food Other (comments)     Shiitake mushrooms- lots of itching and redness     Pollen Extracts Other (comments)         Review of Systems   Review of Systems   Constitutional: Negative for chills and fever. Respiratory: Positive for shortness of breath. Negative for cough. Cardiovascular: Positive for palpitations. Negative for leg swelling. Gastrointestinal: Positive for abdominal distention (bloating). Neurological: Positive for headaches. Psychiatric/Behavioral: The patient is nervous/anxious. All other systems reviewed and are negative. Physical Exam     Vitals:    19 2138   BP: (!) 120/92   Pulse: (!) 110   Resp: 18   Temp: 98.5 °F (36.9 °C)   SpO2: 100%   Weight: 77.1 kg (170 lb)   Height: 5' 8\" (1.727 m)     Physical Exam  Vitals signs and nursing note reviewed. Constitutional:       General: He is not in acute distress. Appearance: He is well-developed. He is not diaphoretic. HENT:      Head: Normocephalic and atraumatic. Right Ear: External ear normal.      Left Ear: External ear normal.      Mouth/Throat:      Pharynx: No oropharyngeal exudate. Eyes:      General: No scleral icterus. Conjunctiva/sclera: Conjunctivae normal.      Pupils: Pupils are equal, round, and reactive to light. Comments: No pallor   Neck:      Musculoskeletal: Normal range of motion and neck supple. Thyroid: No thyromegaly. Vascular: No JVD. Trachea: No tracheal deviation. Cardiovascular:      Rate and Rhythm: Normal rate and regular rhythm. Heart sounds: Normal heart sounds. Pulmonary:      Effort: Pulmonary effort is normal. No respiratory distress. Breath sounds: Normal breath sounds. No stridor. Abdominal:      General: Bowel sounds are normal. There is no distension. Palpations: Abdomen is soft. Tenderness: There is no tenderness. There is no guarding or rebound. Musculoskeletal: Normal range of motion. General: No tenderness. Comments: No soft tissue injuries   Lymphadenopathy:      Cervical: No cervical adenopathy. Skin:     General: Skin is warm and dry. Findings: No erythema or rash. Neurological:      Mental Status: He is alert and oriented to person, place, and time. Cranial Nerves: No cranial nerve deficit. Coordination: Coordination normal.      Deep Tendon Reflexes: Reflexes are normal and symmetric. Reflexes normal.   Psychiatric:         Behavior: Behavior normal.         Thought Content:  Thought content normal.         Judgment: Judgment normal.         Diagnostic Study Results     Labs -     Recent Results (from the past 12 hour(s))   EKG, 12 LEAD, INITIAL    Collection Time: 12/17/19  9:54 PM   Result Value Ref Range    Ventricular Rate 96 BPM    Atrial Rate 96 BPM    P-R Interval 166 ms    QRS Duration 76 ms    Q-T Interval 310 ms    QTC Calculation (Bezet) 391 ms    Calculated P Axis 41 degrees    Calculated R Axis -16 degrees    Calculated T Axis 35 degrees    Diagnosis       Normal sinus rhythm  Possible Left atrial enlargement  Inferior infarct (cited on or before 02-JUL-2015)  Abnormal ECG  When compared with ECG of 16-SEP-2019 21:22,  No significant change was found     CBC WITH AUTOMATED DIFF    Collection Time: 12/17/19  9:55 PM   Result Value Ref Range    WBC 6.1 4.6 - 13.2 K/uL    RBC 4.88 4.70 - 5.50 M/uL    HGB 15.2 13.0 - 16.0 g/dL    HCT 44.0 36.0 - 48.0 %    MCV 90.2 74.0 - 97.0 FL    MCH 31.1 24.0 - 34.0 PG    MCHC 34.5 31.0 - 37.0 g/dL    RDW 14.5 11.6 - 14.5 %    PLATELET 163 319 - 312 K/uL    MPV 10.2 9.2 - 11.8 FL    NEUTROPHILS 42 40 - 73 %    LYMPHOCYTES 43 21 - 52 %    MONOCYTES 11 (H) 3 - 10 %    EOSINOPHILS 4 0 - 5 %    BASOPHILS 0 0 - 2 %    ABS. NEUTROPHILS 2.6 1.8 - 8.0 K/UL    ABS. LYMPHOCYTES 2.6 0.9 - 3.6 K/UL    ABS. MONOCYTES 0.6 0.05 - 1.2 K/UL    ABS. EOSINOPHILS 0.2 0.0 - 0.4 K/UL    ABS. BASOPHILS 0.0 0.0 - 0.1 K/UL    DF AUTOMATED     METABOLIC PANEL, COMPREHENSIVE    Collection Time: 12/17/19  9:55 PM   Result Value Ref Range    Sodium 141 136 - 145 mmol/L    Potassium 3.5 3.5 - 5.5 mmol/L    Chloride 104 100 - 111 mmol/L    CO2 30 21 - 32 mmol/L    Anion gap 7 3.0 - 18 mmol/L    Glucose 101 (H) 74 - 99 mg/dL    BUN 10 7.0 - 18 MG/DL    Creatinine 1.24 0.6 - 1.3 MG/DL    BUN/Creatinine ratio 8 (L) 12 - 20      GFR est AA >60 >60 ml/min/1.73m2    GFR est non-AA >60 >60 ml/min/1.73m2    Calcium 8.9 8.5 - 10.1 MG/DL    Bilirubin, total 0.7 0.2 - 1.0 MG/DL    ALT (SGPT) 36 16 - 61 U/L    AST (SGOT) 18 10 - 38 U/L    Alk. phosphatase 54 45 - 117 U/L    Protein, total 7.0 6.4 - 8.2 g/dL    Albumin 3.8 3.4 - 5.0 g/dL    Globulin 3.2 2.0 - 4.0 g/dL    A-G Ratio 1.2 0.8 - 1.7     CARDIAC PANEL,(CK, CKMB & TROPONIN)    Collection Time: 12/17/19  9:55 PM   Result Value Ref Range     39 - 308 U/L    CK - MB <1.0 <3.6 ng/ml    CK-MB Index  0.0 - 4.0 %     CALCULATION NOT PERFORMED WHEN RESULT IS BELOW LINEAR LIMIT    Troponin-I, QT <0.02 0.0 - 0.045 NG/ML   NT-PRO BNP    Collection Time: 12/17/19  9:55 PM   Result Value Ref Range    NT pro-BNP 6 0 - 450 PG/ML       Radiologic Studies -   XR CHEST PA LAT    (Results Pending)   XR ABD (KUB)    (Results Pending)     11:54 PM  RADIOLOGY FINDINGS  Chest X-ray shows no acute process  Pending review by Radiologist  Recorded by Ty Reece, ED Scribe, as dictated by Leeann Soliman MD    11:54 PM  RADIOLOGY FINDINGS  Abdomen X-ray shows non-obstructive gas bowel pattern, with a prominent gas bubble on his symptomatic side. Pending review by Radiologist  Recorded by AUBREY Tan, as dictated by Natalie Kirk. Norm Soliman MD.       Medical Decision Making   I am the first provider for this patient. I reviewed the vital signs, available nursing notes, past medical history, past surgical history, family history and social history. Vital Signs-Reviewed the patient's vital signs. Pulse Oximetry Analysis - 100% on RA     EKG interpretation: (Preliminary)  NSR at 96 bpm. Unusual P axis. Inferior Q-waves. No acute ischemia. EKG read by Natalie Kirk. Norm Soliman MD at 2154     Records Reviewed: Nursing Notes and Old Medical Records    Provider Notes (Medical Decision Making): Very unlikely ACS, PE, or cardiogenic CP. No signs of anemia. Pro-NP and lipase were added to blood work to look for heart failure or pancreas complications. PROCEDURES:  Procedures    MEDICATIONS GIVEN IN THE ED:  Medications - No data to display     ED COURSE:   11:01 PM   Initial assessment performed. PROGRESS NOTE:  11:58 PM  Pt and/or family have been updated on their results. Pt and/or pt's family are aware of the plan of care and are in agreement. Written by AUBREY Hagan, as dictated by Leeann Soliman MD.      Diagnosis and Disposition       DISCHARGE NOTE:  11:58 PM  The patient is ready for discharge. The patient's signs, symptoms, diagnosis, and discharge instructions have been discussed and the patient and/or family has conveyed their understanding. The patient and/or family is to follow up as recommended or return to the ER should their symptoms worsen. Plan has been discussed and the patient and/or family is in agreement. Written by AUBREY Hagan, as dictated by Leeann Soliman MD.     CLINICAL IMPRESSION:  1. Right sided abdominal pain    2. Gas pain    3. Dyspnea on exertion    4.  History of acute inferior wall MI PLAN:  1. D/C Home  2. Current Discharge Medication List      CONTINUE these medications which have NOT CHANGED    Details   ALPRAZolam (XANAX) 1 mg tablet Take 1 mg by mouth. ikujkcbas-ttnuknmn-zekvwhm ala (BIKTARVY) tab tablet Take 1 Tab by mouth.      meclizine (ANTIVERT) 25 mg tablet Take 25 mg by mouth three (3) times daily as needed. valACYclovir (VALTREX) 1 gram tablet Take  by mouth.      ketoconazole (NIZORAL) 2 % shampoo Apply  to affected area daily as needed for Itching. clindamycin (CLINDAGEL) 1 % topical gel Apply  to affected area two (2) times a day. use thin film on affected area      atorvastatin (LIPITOR) 10 mg tablet Take 10 mg by mouth daily. desloratadine (CLARINEX) 5 mg tablet Take 5 mg by mouth daily. gabapentin (NEURONTIN) 300 mg capsule Take 300 mg by mouth as needed. Aspirin, Buffered 81 mg tab Take 81 mg by mouth daily. metoprolol succinate (TOPROL-XL) 25 mg XL tablet Take 25 mg by mouth daily. Pt instructed to take with a small bit of water on DOS      nitroglycerin (NITROSTAT) 0.4 mg SL tablet 0.4 mg by SubLINGual route every five (5) minutes as needed for Chest Pain. mupirocin (BACTROBAN) 2 % ointment Apply  to affected area daily. Qty: 22 g, Refills: 0      methocarbamol (ROBAXIN) 500 mg tablet Take 2 Tabs by mouth three (3) times daily. Qty: 24 Tab, Refills: 0           3. Follow-up Information     Follow up With Specialties Details Why Contact Info    Frank Hernandez MD Internal Medicine Schedule an appointment as soon as possible for a visit for primary care follow up [phone]      [facility] EMERGENCY DEPT Emergency Medicine  As needed, If symptoms worsen [address] [address] [phone]        _______________________________    Attestations: This note is prepared by Azucena Gonzalez, acting as Scribe for Jamari Simms MD.    [employer].  Latisha Simms MD: The scribe's documentation has been prepared under my direction and personally reviewed by me in its entirety.  I confirm that the note above accurately reflects all work, treatment, procedures, and medical decision making performed by me.  _______________________________